# Patient Record
Sex: FEMALE | Race: WHITE | NOT HISPANIC OR LATINO | Employment: FULL TIME | URBAN - METROPOLITAN AREA
[De-identification: names, ages, dates, MRNs, and addresses within clinical notes are randomized per-mention and may not be internally consistent; named-entity substitution may affect disease eponyms.]

---

## 2023-07-13 ENCOUNTER — APPOINTMENT (OUTPATIENT)
Dept: PRIMARY CARE | Facility: CLINIC | Age: 43
End: 2023-07-13
Payer: COMMERCIAL

## 2023-08-24 ENCOUNTER — APPOINTMENT (OUTPATIENT)
Dept: PRIMARY CARE | Facility: CLINIC | Age: 43
End: 2023-08-24
Payer: COMMERCIAL

## 2023-08-24 PROBLEM — I83.90 VARICOSE VEINS OF LOWER LIMB: Status: ACTIVE | Noted: 2023-08-24

## 2023-08-24 PROBLEM — N39.0 UTI (URINARY TRACT INFECTION): Status: ACTIVE | Noted: 2023-08-24

## 2023-08-24 PROBLEM — G43.909 MIGRAINE HEADACHE: Status: ACTIVE | Noted: 2023-08-24

## 2023-08-24 PROBLEM — O09.01 PREGNANCY WITH HISTORY OF INFERTILITY IN FIRST TRIMESTER (HHS-HCC): Status: ACTIVE | Noted: 2023-08-24

## 2023-08-24 PROBLEM — H81.10 BENIGN PAROXYSMAL POSITIONAL VERTIGO: Status: ACTIVE | Noted: 2023-08-24

## 2023-08-24 PROBLEM — R42 VERTIGO: Status: ACTIVE | Noted: 2023-08-24

## 2023-08-24 PROBLEM — R43.0 ANOSMIA: Status: ACTIVE | Noted: 2023-08-24

## 2023-08-24 PROBLEM — M54.50 LOW BACK PAIN: Status: ACTIVE | Noted: 2023-08-24

## 2023-08-24 PROBLEM — M25.579 PAIN, ANKLE: Status: ACTIVE | Noted: 2023-08-24

## 2023-08-24 PROBLEM — N91.5 OLIGOMENORRHEA: Status: ACTIVE | Noted: 2023-08-24

## 2023-08-24 PROBLEM — R13.10 ODYNOPHAGIA: Status: ACTIVE | Noted: 2023-08-24

## 2023-08-24 PROBLEM — R35.0 URINARY FREQUENCY: Status: ACTIVE | Noted: 2023-08-24

## 2023-08-24 PROBLEM — G43.109 OCULAR MIGRAINE: Status: ACTIVE | Noted: 2023-08-24

## 2023-08-24 PROBLEM — I83.819 VARICOSE VEINS WITH PAIN: Status: ACTIVE | Noted: 2023-08-24

## 2023-08-24 PROBLEM — Z20.822 EXPOSURE TO COVID-19 VIRUS: Status: ACTIVE | Noted: 2023-08-24

## 2023-08-24 PROBLEM — R92.30 DENSE BREAST TISSUE ON MAMMOGRAM: Status: ACTIVE | Noted: 2023-08-24

## 2023-08-24 PROBLEM — R73.9 HYPERGLYCEMIA: Status: ACTIVE | Noted: 2023-08-24

## 2023-08-24 PROBLEM — K21.9 GERD (GASTROESOPHAGEAL REFLUX DISEASE): Status: ACTIVE | Noted: 2023-08-24

## 2023-08-24 PROBLEM — K21.9 LARYNGOPHARYNGEAL REFLUX (LPR): Status: ACTIVE | Noted: 2023-08-24

## 2023-08-24 PROBLEM — R49.0 VOICE HOARSENESS: Status: ACTIVE | Noted: 2023-08-24

## 2023-08-24 RX ORDER — SUMATRIPTAN SUCCINATE 100 MG/1
100 TABLET ORAL AS NEEDED
COMMUNITY
End: 2023-09-07 | Stop reason: SDUPTHER

## 2023-09-07 ENCOUNTER — OFFICE VISIT (OUTPATIENT)
Dept: PRIMARY CARE | Facility: CLINIC | Age: 43
End: 2023-09-07
Payer: COMMERCIAL

## 2023-09-07 VITALS
WEIGHT: 109 LBS | TEMPERATURE: 98.2 F | RESPIRATION RATE: 16 BRPM | OXYGEN SATURATION: 100 % | HEART RATE: 76 BPM | SYSTOLIC BLOOD PRESSURE: 100 MMHG | DIASTOLIC BLOOD PRESSURE: 66 MMHG | HEIGHT: 66 IN | BODY MASS INDEX: 17.52 KG/M2

## 2023-09-07 DIAGNOSIS — G43.109 OCULAR MIGRAINE: ICD-10-CM

## 2023-09-07 DIAGNOSIS — Z00.00 HEALTH CARE MAINTENANCE: Primary | ICD-10-CM

## 2023-09-07 DIAGNOSIS — Z12.11 SCREEN FOR COLON CANCER: ICD-10-CM

## 2023-09-07 PROBLEM — R13.10 ODYNOPHAGIA: Status: RESOLVED | Noted: 2023-08-24 | Resolved: 2023-09-07

## 2023-09-07 PROBLEM — R43.0 ANOSMIA: Status: RESOLVED | Noted: 2023-08-24 | Resolved: 2023-09-07

## 2023-09-07 PROBLEM — I83.819 VARICOSE VEINS WITH PAIN: Status: RESOLVED | Noted: 2023-08-24 | Resolved: 2023-09-07

## 2023-09-07 PROBLEM — R49.0 VOICE HOARSENESS: Status: RESOLVED | Noted: 2023-08-24 | Resolved: 2023-09-07

## 2023-09-07 PROBLEM — M54.50 LOW BACK PAIN: Status: RESOLVED | Noted: 2023-08-24 | Resolved: 2023-09-07

## 2023-09-07 PROBLEM — K21.9 LARYNGOPHARYNGEAL REFLUX (LPR): Status: RESOLVED | Noted: 2023-08-24 | Resolved: 2023-09-07

## 2023-09-07 PROBLEM — R92.30 DENSE BREAST TISSUE ON MAMMOGRAM: Status: RESOLVED | Noted: 2023-08-24 | Resolved: 2023-09-07

## 2023-09-07 PROBLEM — G43.909 MIGRAINE HEADACHE: Status: RESOLVED | Noted: 2023-08-24 | Resolved: 2023-09-07

## 2023-09-07 PROBLEM — I83.90 VARICOSE VEINS OF LOWER LIMB: Status: RESOLVED | Noted: 2023-08-24 | Resolved: 2023-09-07

## 2023-09-07 PROBLEM — N39.0 UTI (URINARY TRACT INFECTION): Status: RESOLVED | Noted: 2023-08-24 | Resolved: 2023-09-07

## 2023-09-07 PROBLEM — M25.579 PAIN, ANKLE: Status: RESOLVED | Noted: 2023-08-24 | Resolved: 2023-09-07

## 2023-09-07 PROBLEM — H81.10 BENIGN PAROXYSMAL POSITIONAL VERTIGO: Status: RESOLVED | Noted: 2023-08-24 | Resolved: 2023-09-07

## 2023-09-07 PROBLEM — Z20.822 EXPOSURE TO COVID-19 VIRUS: Status: RESOLVED | Noted: 2023-08-24 | Resolved: 2023-09-07

## 2023-09-07 PROBLEM — R35.0 URINARY FREQUENCY: Status: RESOLVED | Noted: 2023-08-24 | Resolved: 2023-09-07

## 2023-09-07 PROBLEM — N91.5 OLIGOMENORRHEA: Status: RESOLVED | Noted: 2023-08-24 | Resolved: 2023-09-07

## 2023-09-07 PROBLEM — R73.9 HYPERGLYCEMIA: Status: RESOLVED | Noted: 2023-08-24 | Resolved: 2023-09-07

## 2023-09-07 PROBLEM — R42 VERTIGO: Status: RESOLVED | Noted: 2023-08-24 | Resolved: 2023-09-07

## 2023-09-07 PROBLEM — O09.01 PREGNANCY WITH HISTORY OF INFERTILITY IN FIRST TRIMESTER (HHS-HCC): Status: RESOLVED | Noted: 2023-08-24 | Resolved: 2023-09-07

## 2023-09-07 PROCEDURE — 1036F TOBACCO NON-USER: CPT | Performed by: INTERNAL MEDICINE

## 2023-09-07 PROCEDURE — 99396 PREV VISIT EST AGE 40-64: CPT | Performed by: INTERNAL MEDICINE

## 2023-09-07 RX ORDER — SUMATRIPTAN SUCCINATE 100 MG/1
100 TABLET ORAL AS NEEDED
Qty: 12 TABLET | Refills: 1 | Status: SHIPPED | OUTPATIENT
Start: 2023-09-07 | End: 2024-01-08

## 2023-09-07 ASSESSMENT — ENCOUNTER SYMPTOMS
DIARRHEA: 0
HEADACHES: 0
EYE REDNESS: 0
COUGH: 0
UNEXPECTED WEIGHT CHANGE: 0
ARTHRALGIAS: 0
BACK PAIN: 0
EYE ITCHING: 0
EYE PAIN: 0
FATIGUE: 0
PALPITATIONS: 0
FEVER: 0
WEAKNESS: 0
ABDOMINAL PAIN: 0
DYSURIA: 0
SORE THROAT: 0
CONSTIPATION: 0
DIFFICULTY URINATING: 0
NAUSEA: 0
FREQUENCY: 0
PSYCHIATRIC NEGATIVE: 1
WHEEZING: 0
RHINORRHEA: 0
SHORTNESS OF BREATH: 0

## 2023-09-07 ASSESSMENT — PAIN SCALES - GENERAL: PAINLEVEL: 0-NO PAIN

## 2023-09-07 ASSESSMENT — PATIENT HEALTH QUESTIONNAIRE - PHQ9
1. LITTLE INTEREST OR PLEASURE IN DOING THINGS: NOT AT ALL
SUM OF ALL RESPONSES TO PHQ9 QUESTIONS 1 AND 2: 0
2. FEELING DOWN, DEPRESSED OR HOPELESS: NOT AT ALL

## 2023-09-07 NOTE — PROGRESS NOTES
"Subjective   Sheeba Omalley is a 42 y.o. female who presents for Annual Exam.    HPI   Influenza at work  Covid 2021  Tdap 2017  Mammogram 7/2023  Pap 2020 GYN  Bmi 17  Depression screen 23  Eye exam 23    No concerns    Review of Systems   Constitutional:  Negative for fatigue, fever and unexpected weight change.   HENT:  Negative for congestion, ear pain, rhinorrhea and sore throat.    Eyes:  Negative for pain, redness and itching.   Respiratory:  Negative for cough, shortness of breath and wheezing.    Cardiovascular:  Negative for chest pain, palpitations and leg swelling.   Gastrointestinal:  Negative for abdominal pain, constipation, diarrhea and nausea.   Genitourinary:  Negative for difficulty urinating, dysuria and frequency.   Musculoskeletal:  Negative for arthralgias and back pain.   Allergic/Immunologic: Negative for environmental allergies, food allergies and immunocompromised state.   Neurological:  Negative for weakness and headaches.   Psychiatric/Behavioral: Negative.     All other systems reviewed and are negative.      Health Maintenance Due   Topic Date Due    Yearly Adult Physical  Never done    Hepatitis B Vaccines (1 of 3 - 3-dose series) Never done    HIV Screening  Never done    MMR Vaccines (1 of 1 - Standard series) Never done    Hepatitis C Screening  Never done    COVID-19 Vaccine (3 - Moderna series) 04/16/2021    Cervical Cancer Screening  07/30/2023    Influenza Vaccine (1) 09/01/2023       Objective   /66   Pulse 76   Temp 36.8 °C (98.2 °F)   Resp 16   Ht 1.676 m (5' 6\")   Wt 49.4 kg (109 lb)   LMP 08/23/2023 (Approximate)   SpO2 100%   BMI 17.59 kg/m²     Physical Exam  Vitals and nursing note reviewed.   Constitutional:       Appearance: Normal appearance.   HENT:      Head: Normocephalic.   Eyes:      Conjunctiva/sclera: Conjunctivae normal.      Pupils: Pupils are equal, round, and reactive to light.   Cardiovascular:      Rate and Rhythm: Normal rate and " regular rhythm.      Pulses: Normal pulses.      Heart sounds: Normal heart sounds.   Pulmonary:      Effort: Pulmonary effort is normal.      Breath sounds: Normal breath sounds.   Musculoskeletal:         General: No swelling.      Cervical back: Neck supple.   Skin:     General: Skin is warm and dry.   Neurological:      General: No focal deficit present.      Mental Status: She is oriented to person, place, and time.         Assessment/Plan   Problem List Items Addressed This Visit       Ocular migraine    Relevant Medications    SUMAtriptan (Imitrex) 100 mg tablet     Other Visit Diagnoses       Health care maintenance    -  Primary    Relevant Orders    CBC    Comprehensive Metabolic Panel    Lipid Panel    Thyroid Stimulating Hormone    Vitamin B12    Vitamin D 25-Hydroxy,Total (for eval of Vitamin D levels)

## 2024-01-06 DIAGNOSIS — G43.109 OCULAR MIGRAINE: ICD-10-CM

## 2024-01-08 RX ORDER — SUMATRIPTAN SUCCINATE 100 MG/1
100 TABLET ORAL AS NEEDED
Qty: 12 TABLET | Refills: 1 | Status: SHIPPED | OUTPATIENT
Start: 2024-01-08 | End: 2024-02-13 | Stop reason: SDUPTHER

## 2024-01-09 ENCOUNTER — TELEPHONE (OUTPATIENT)
Dept: GASTROENTEROLOGY | Facility: CLINIC | Age: 44
End: 2024-01-09
Payer: COMMERCIAL

## 2024-02-08 ENCOUNTER — OFFICE VISIT (OUTPATIENT)
Dept: PRIMARY CARE | Facility: CLINIC | Age: 44
End: 2024-02-08
Payer: COMMERCIAL

## 2024-02-08 VITALS
BODY MASS INDEX: 18.24 KG/M2 | HEART RATE: 78 BPM | DIASTOLIC BLOOD PRESSURE: 64 MMHG | WEIGHT: 113 LBS | TEMPERATURE: 97.8 F | OXYGEN SATURATION: 98 % | RESPIRATION RATE: 18 BRPM | SYSTOLIC BLOOD PRESSURE: 112 MMHG

## 2024-02-08 DIAGNOSIS — R39.9 UTI SYMPTOMS: Primary | ICD-10-CM

## 2024-02-08 LAB
POC APPEARANCE, URINE: CLEAR
POC BILIRUBIN, URINE: NEGATIVE
POC BLOOD, URINE: NEGATIVE
POC COLOR, URINE: YELLOW
POC GLUCOSE, URINE: NEGATIVE MG/DL
POC KETONES, URINE: NEGATIVE MG/DL
POC LEUKOCYTES, URINE: NEGATIVE
POC NITRITE,URINE: NEGATIVE
POC PH, URINE: 6 PH
POC PROTEIN, URINE: NEGATIVE MG/DL
POC SPECIFIC GRAVITY, URINE: 1.02
POC UROBILINOGEN, URINE: 0.2 EU/DL
PREGNANCY TEST URINE, POC: NEGATIVE

## 2024-02-08 PROCEDURE — 87086 URINE CULTURE/COLONY COUNT: CPT

## 2024-02-08 PROCEDURE — 81025 URINE PREGNANCY TEST: CPT | Performed by: FAMILY MEDICINE

## 2024-02-08 PROCEDURE — 1036F TOBACCO NON-USER: CPT | Performed by: FAMILY MEDICINE

## 2024-02-08 PROCEDURE — 99214 OFFICE O/P EST MOD 30 MIN: CPT | Performed by: FAMILY MEDICINE

## 2024-02-08 PROCEDURE — 81002 URINALYSIS NONAUTO W/O SCOPE: CPT | Performed by: FAMILY MEDICINE

## 2024-02-08 RX ORDER — NITROFURANTOIN 25; 75 MG/1; MG/1
100 CAPSULE ORAL 2 TIMES DAILY
Qty: 6 CAPSULE | Refills: 0 | Status: SHIPPED | OUTPATIENT
Start: 2024-02-08 | End: 2024-02-13 | Stop reason: ALTCHOICE

## 2024-02-08 ASSESSMENT — ENCOUNTER SYMPTOMS
HEMATURIA: 0
CONSTIPATION: 0
WHEEZING: 0
FLANK PAIN: 1
VOMITING: 0
FATIGUE: 0
DIARRHEA: 0
FREQUENCY: 1
DYSURIA: 0
BLOOD IN STOOL: 0
SHORTNESS OF BREATH: 0
DIFFICULTY URINATING: 0
COUGH: 0
NAUSEA: 0
FEVER: 0

## 2024-02-08 NOTE — ASSESSMENT & PLAN NOTE
Advise pt ua appears normal, will send out uc  Pt does not want to start atbx unless uc positive.  Pt declines any std testing today  Advise pt if urinary symptoms worsen or uc is positive then start macrodantin, call on call MD for advise if unsure of what to do based on uc.  Increase flds  F/up with pcp if cont symptoms

## 2024-02-08 NOTE — PROGRESS NOTES
Subjective   Patient ID: Sheeba mOalley is a 43 y.o. female who presents for UTI.    UTI   This is a new problem. The current episode started 1 to 4 weeks ago (2 weeks). The problem occurs intermittently. The problem has been waxing and waning. The quality of the pain is described as aching. The pain is mild. Associated symptoms include flank pain, frequency and urgency. Pertinent negatives include no hematuria, nausea or vomiting. Associated symptoms comments: bloating. She has tried nothing for the symptoms.        Review of Systems   Constitutional:  Negative for fatigue and fever.   Respiratory:  Negative for cough, shortness of breath and wheezing.    Cardiovascular:  Negative for chest pain.   Gastrointestinal:  Negative for blood in stool, constipation, diarrhea, nausea and vomiting.        Pt feels bloating, no pain, had urinary symptoms x 2 wk  No change in diet , no new foods.No travel.  Daily bm, have been looser than normal.   Genitourinary:  Positive for flank pain, frequency and urgency. Negative for difficulty urinating, dysuria, hematuria, vaginal discharge and vaginal pain.        2 wk hx of bloating and urinary symptoms.  Pt is sexually active, 1 partner, no hx of std. Has been monogamous.  LMP= 2/4/24  Pt not on birth control , denies pregnancy.       Objective   /64   Pulse 78   Temp 36.6 °C (97.8 °F) (Temporal)   Resp 18   Wt 51.3 kg (113 lb)   SpO2 98%   BMI 18.24 kg/m²     Physical Exam  Vitals and nursing note reviewed.   Constitutional:       General: She is not in acute distress.     Appearance: Normal appearance.   HENT:      Head: Normocephalic and atraumatic.   Cardiovascular:      Rate and Rhythm: Normal rate and regular rhythm.      Heart sounds: Normal heart sounds.   Pulmonary:      Effort: Pulmonary effort is normal.      Breath sounds: Normal breath sounds.   Abdominal:      General: Abdomen is flat. Bowel sounds are normal.      Palpations: Abdomen is soft. There  is no mass.      Tenderness: There is no abdominal tenderness. There is no right CVA tenderness, left CVA tenderness, guarding or rebound.      Comments: Abd exam is benign, this is not a surgical abd at this time   Skin:     General: Skin is warm and dry.   Neurological:      Mental Status: She is alert.         Assessment/Plan   Problem List Items Addressed This Visit             ICD-10-CM    UTI symptoms - Primary R39.9     Advise pt ua appears normal, will send out uc  Pt does not want to start atbx unless uc positive.  Pt declines any std testing today  Advise pt if urinary symptoms worsen or uc is positive then start macrodantin, call on call MD for advise if unsure of what to do based on uc.  Increase flds  F/up with pcp if cont symptoms         Relevant Medications    nitrofurantoin, macrocrystal-monohydrate, (Macrobid) 100 mg capsule    Other Relevant Orders    Urine Culture    POCT UA (nonautomated) manually resulted (Completed)    POCT pregnancy, urine manually resulted (Completed)

## 2024-02-09 LAB — BACTERIA UR CULT: NORMAL

## 2024-02-13 ENCOUNTER — OFFICE VISIT (OUTPATIENT)
Dept: PRIMARY CARE | Facility: CLINIC | Age: 44
End: 2024-02-13
Payer: COMMERCIAL

## 2024-02-13 ENCOUNTER — LAB (OUTPATIENT)
Dept: LAB | Facility: LAB | Age: 44
End: 2024-02-13
Payer: COMMERCIAL

## 2024-02-13 VITALS
OXYGEN SATURATION: 100 % | DIASTOLIC BLOOD PRESSURE: 72 MMHG | RESPIRATION RATE: 16 BRPM | HEIGHT: 66 IN | WEIGHT: 114 LBS | TEMPERATURE: 98.7 F | BODY MASS INDEX: 18.32 KG/M2 | HEART RATE: 80 BPM | SYSTOLIC BLOOD PRESSURE: 116 MMHG

## 2024-02-13 DIAGNOSIS — R10.84 DIFFUSE ABDOMINAL PAIN: ICD-10-CM

## 2024-02-13 DIAGNOSIS — Z00.00 HEALTH CARE MAINTENANCE: ICD-10-CM

## 2024-02-13 DIAGNOSIS — G43.109 OCULAR MIGRAINE: ICD-10-CM

## 2024-02-13 DIAGNOSIS — R10.84 DIFFUSE ABDOMINAL PAIN: Primary | ICD-10-CM

## 2024-02-13 PROBLEM — R39.9 UTI SYMPTOMS: Status: RESOLVED | Noted: 2024-02-08 | Resolved: 2024-02-13

## 2024-02-13 LAB
25(OH)D3 SERPL-MCNC: 32 NG/ML (ref 30–100)
ALBUMIN SERPL BCP-MCNC: 4.3 G/DL (ref 3.4–5)
ALP SERPL-CCNC: 41 U/L (ref 33–110)
ALT SERPL W P-5'-P-CCNC: 15 U/L (ref 7–45)
ANION GAP SERPL CALC-SCNC: 10 MMOL/L (ref 10–20)
AST SERPL W P-5'-P-CCNC: 18 U/L (ref 9–39)
BILIRUB SERPL-MCNC: 0.6 MG/DL (ref 0–1.2)
BUN SERPL-MCNC: 16 MG/DL (ref 6–23)
CALCIUM SERPL-MCNC: 9 MG/DL (ref 8.6–10.3)
CHLORIDE SERPL-SCNC: 102 MMOL/L (ref 98–107)
CHOLEST SERPL-MCNC: 177 MG/DL (ref 0–199)
CHOLESTEROL/HDL RATIO: 2.8
CO2 SERPL-SCNC: 30 MMOL/L (ref 21–32)
CREAT SERPL-MCNC: 0.64 MG/DL (ref 0.5–1.05)
CRP SERPL-MCNC: <0.1 MG/DL
EGFRCR SERPLBLD CKD-EPI 2021: >90 ML/MIN/1.73M*2
ERYTHROCYTE [DISTWIDTH] IN BLOOD BY AUTOMATED COUNT: 12.6 % (ref 11.5–14.5)
ERYTHROCYTE [SEDIMENTATION RATE] IN BLOOD BY WESTERGREN METHOD: 1 MM/H (ref 0–20)
GLUCOSE SERPL-MCNC: 90 MG/DL (ref 74–99)
HCT VFR BLD AUTO: 39.2 % (ref 36–46)
HDLC SERPL-MCNC: 64.3 MG/DL
HGB BLD-MCNC: 13.4 G/DL (ref 12–16)
LDLC SERPL CALC-MCNC: 89 MG/DL
MCH RBC QN AUTO: 32.1 PG (ref 26–34)
MCHC RBC AUTO-ENTMCNC: 34.2 G/DL (ref 32–36)
MCV RBC AUTO: 94 FL (ref 80–100)
NON HDL CHOLESTEROL: 113 MG/DL (ref 0–149)
NRBC BLD-RTO: 0 /100 WBCS (ref 0–0)
PLATELET # BLD AUTO: 253 X10*3/UL (ref 150–450)
POTASSIUM SERPL-SCNC: 3.8 MMOL/L (ref 3.5–5.3)
PROT SERPL-MCNC: 6.8 G/DL (ref 6.4–8.2)
RBC # BLD AUTO: 4.18 X10*6/UL (ref 4–5.2)
SODIUM SERPL-SCNC: 138 MMOL/L (ref 136–145)
TRIGL SERPL-MCNC: 120 MG/DL (ref 0–149)
TSH SERPL-ACNC: 0.73 MIU/L (ref 0.44–3.98)
VIT B12 SERPL-MCNC: 400 PG/ML (ref 211–911)
VLDL: 24 MG/DL (ref 0–40)
WBC # BLD AUTO: 6.9 X10*3/UL (ref 4.4–11.3)

## 2024-02-13 PROCEDURE — 36415 COLL VENOUS BLD VENIPUNCTURE: CPT

## 2024-02-13 PROCEDURE — 84443 ASSAY THYROID STIM HORMONE: CPT

## 2024-02-13 PROCEDURE — 80061 LIPID PANEL: CPT

## 2024-02-13 PROCEDURE — 85027 COMPLETE CBC AUTOMATED: CPT

## 2024-02-13 PROCEDURE — 82306 VITAMIN D 25 HYDROXY: CPT

## 2024-02-13 PROCEDURE — 85652 RBC SED RATE AUTOMATED: CPT

## 2024-02-13 PROCEDURE — 86140 C-REACTIVE PROTEIN: CPT

## 2024-02-13 PROCEDURE — 82607 VITAMIN B-12: CPT

## 2024-02-13 PROCEDURE — 1036F TOBACCO NON-USER: CPT | Performed by: INTERNAL MEDICINE

## 2024-02-13 PROCEDURE — 99214 OFFICE O/P EST MOD 30 MIN: CPT | Performed by: INTERNAL MEDICINE

## 2024-02-13 PROCEDURE — 80053 COMPREHEN METABOLIC PANEL: CPT

## 2024-02-13 RX ORDER — SUMATRIPTAN SUCCINATE 100 MG/1
100 TABLET ORAL AS NEEDED
Qty: 12 TABLET | Refills: 1 | Status: SHIPPED | OUTPATIENT
Start: 2024-02-13

## 2024-02-13 ASSESSMENT — PATIENT HEALTH QUESTIONNAIRE - PHQ9
2. FEELING DOWN, DEPRESSED OR HOPELESS: NOT AT ALL
1. LITTLE INTEREST OR PLEASURE IN DOING THINGS: NOT AT ALL
SUM OF ALL RESPONSES TO PHQ9 QUESTIONS 1 AND 2: 0

## 2024-02-13 ASSESSMENT — ENCOUNTER SYMPTOMS
FEVER: 0
COUGH: 0
SHORTNESS OF BREATH: 0
FATIGUE: 0

## 2024-02-13 NOTE — PROGRESS NOTES
"Subjective   Sheeba Omalley is a 43 y.o. female who presents for Abdominal Pain.    HPI   Pt c/o abominal distention x3 weeks.    Uncomfortable.  Seen at [facility] 2/8/24.  Negative for UTI, pregnancy.  Rx: Macrobid.  Stopped after 4 doses d/t negative UA C&S.  Occasional nausea.  Denies constipation/diarrhea/emesis.  Denies visible blood in stool, black stool.  No new dietary changes, med changes.    3.5 weeks distention and bloated  Bm daily  Minor loose stool  Had period and no improvement  Ok in am  More distended as day goes on  Min nausea  Periods are regular  Sick contacts        Review of Systems   Constitutional:  Negative for fatigue and fever.   Respiratory:  Negative for cough and shortness of breath.    Cardiovascular:  Negative for chest pain and leg swelling.   All other systems reviewed and are negative.      Health Maintenance Due   Topic Date Due    Hepatitis B Vaccines (1 of 3 - 3-dose series) Never done    HIV Screening  Never done    Colorectal Cancer Screening  Never done    MMR Vaccines (1 of 1 - Standard series) Never done    Hepatitis C Screening  Never done    Cervical Cancer Screening  07/30/2023    Influenza Vaccine (1) 09/01/2023    COVID-19 Vaccine (3 - 2023-24 season) 09/01/2023       Objective   /72   Pulse 80   Temp 37.1 °C (98.7 °F)   Resp 16   Ht 1.676 m (5' 6\")   Wt 51.7 kg (114 lb)   SpO2 100%   BMI 18.40 kg/m²     Physical Exam  Vitals and nursing note reviewed.   Constitutional:       Appearance: Normal appearance.   HENT:      Head: Normocephalic.   Eyes:      Conjunctiva/sclera: Conjunctivae normal.      Pupils: Pupils are equal, round, and reactive to light.   Cardiovascular:      Rate and Rhythm: Normal rate and regular rhythm.      Pulses: Normal pulses.      Heart sounds: Normal heart sounds.   Pulmonary:      Effort: Pulmonary effort is normal.      Breath sounds: Normal breath sounds.   Abdominal:      General: There is distension.      " Tenderness: There is no abdominal tenderness.   Musculoskeletal:         General: No swelling.      Cervical back: Neck supple.   Skin:     General: Skin is warm and dry.   Neurological:      General: No focal deficit present.      Mental Status: She is oriented to person, place, and time.         Assessment/Plan   Problem List Items Addressed This Visit       Ocular migraine    Relevant Medications    SUMAtriptan (Imitrex) 100 mg tablet     Other Visit Diagnoses       Diffuse abdominal pain    -  Primary    Relevant Orders    Abdominal Ultrasound    Sedimentation Rate    C-reactive protein    US pelvis          Check  us and labs  Stable based on symptoms and exam.  Continue established treatment plan and follow up at least yearly.  Cont meds

## 2024-02-14 ENCOUNTER — HOSPITAL ENCOUNTER (OUTPATIENT)
Dept: RADIOLOGY | Facility: HOSPITAL | Age: 44
Discharge: HOME | End: 2024-02-14
Payer: COMMERCIAL

## 2024-02-14 ENCOUNTER — TELEPHONE (OUTPATIENT)
Dept: PRIMARY CARE | Facility: CLINIC | Age: 44
End: 2024-02-14
Payer: COMMERCIAL

## 2024-02-14 DIAGNOSIS — R10.84 DIFFUSE ABDOMINAL PAIN: ICD-10-CM

## 2024-02-14 PROCEDURE — 76830 TRANSVAGINAL US NON-OB: CPT | Performed by: RADIOLOGY

## 2024-02-14 PROCEDURE — 76856 US EXAM PELVIC COMPLETE: CPT | Performed by: RADIOLOGY

## 2024-02-14 PROCEDURE — 76856 US EXAM PELVIC COMPLETE: CPT

## 2024-02-14 PROCEDURE — 93976 VASCULAR STUDY: CPT | Performed by: RADIOLOGY

## 2024-02-14 NOTE — TELEPHONE ENCOUNTER
Dr. Mendiola ordered US Pelvis and Abdominal US yesterday. Visible in her visit note.  Does not look like Abdominal US  was scheduled (visible under procedures) and pelvic was changed today to US Pelvis Transabdominal w/Transvaginal?  On your end, do you happen to see/remember if you scheduled pt for both US Pelvis and Abdominal US?

## 2024-02-15 ENCOUNTER — TELEPHONE (OUTPATIENT)
Dept: PRIMARY CARE | Facility: CLINIC | Age: 44
End: 2024-02-15
Payer: COMMERCIAL

## 2024-02-15 DIAGNOSIS — R10.84 DIFFUSE ABDOMINAL PAIN: Primary | ICD-10-CM

## 2024-02-15 NOTE — TELEPHONE ENCOUNTER
----- Message from Lacy Lopez LPN sent at 2/15/2024  7:57 AM EST -----  Regarding: FW: Lab Results  Contact: [phone]  I sent Elva a message on this yesterday.  Can you please look into this?  (See additional note in chart)  ----- Message -----  From: Ivet Mendiola DO  Sent: 2/14/2024  10:56 AM EST  To: Lacy Lopez LPN  Subject: FW: Lab Results                                  I ordered both I can see the abdominal us in my note but dont see the order   Can you check with elva?  ----- Message -----  From: Lacy Lopez LPN  Sent: 2/14/2024  10:13 AM EST  To: Ivet Mendiola DO  Subject: FW: Lab Results                                    ----- Message -----  From: Sheeba Omalley  Sent: 2/14/2024   9:57 AM EST  To: [facility] [place] [id number] Clinical Support Staff  Subject: Lab Results                                      Thank you. I completed the ordered Pelvic Ultra sound today. However, we had discussed doing an abdominal and pelvic US since I was having abdominal distention. I’m not sure if she decided against the abdominal US or if it was accidentally not ordered. Thank you

## 2024-02-15 NOTE — TELEPHONE ENCOUNTER
I spoke with patient was understanding, could not schedule due to order not in and able to attach.  Can you please put new order in, patient said OK for us to schedule next week between 9-2

## 2024-02-15 NOTE — TELEPHONE ENCOUNTER
----- Message from Ivet Mendiola DO sent at 2/15/2024  1:12 PM EST -----  Call patient pelvic us is normal  I apolgize that no one told me they couldn't schedule the abd us I ordered

## 2024-02-15 NOTE — RESULT ENCOUNTER NOTE
Call patient pelvic us is normal  I apolgize that no one told me they couldn't schedule the abd us I ordered

## 2024-02-20 ENCOUNTER — APPOINTMENT (OUTPATIENT)
Dept: RADIOLOGY | Facility: HOSPITAL | Age: 44
End: 2024-02-20
Payer: COMMERCIAL

## 2024-02-21 ENCOUNTER — HOSPITAL ENCOUNTER (OUTPATIENT)
Dept: RADIOLOGY | Facility: HOSPITAL | Age: 44
Discharge: HOME | End: 2024-02-21
Payer: COMMERCIAL

## 2024-02-21 DIAGNOSIS — R10.84 DIFFUSE ABDOMINAL PAIN: ICD-10-CM

## 2024-02-21 PROCEDURE — 76700 US EXAM ABDOM COMPLETE: CPT

## 2024-02-21 PROCEDURE — 76700 US EXAM ABDOM COMPLETE: CPT | Performed by: RADIOLOGY

## 2024-02-23 ENCOUNTER — TELEPHONE (OUTPATIENT)
Dept: PRIMARY CARE | Facility: CLINIC | Age: 44
End: 2024-02-23
Payer: COMMERCIAL

## 2024-02-23 DIAGNOSIS — N13.39 OTHER HYDRONEPHROSIS: Primary | ICD-10-CM

## 2024-03-08 ENCOUNTER — HOSPITAL ENCOUNTER (OUTPATIENT)
Dept: RADIOLOGY | Facility: HOSPITAL | Age: 44
Discharge: HOME | End: 2024-03-08
Payer: COMMERCIAL

## 2024-03-08 DIAGNOSIS — N13.39 OTHER HYDRONEPHROSIS: ICD-10-CM

## 2024-03-08 PROCEDURE — 74178 CT ABD&PLV WO CNTR FLWD CNTR: CPT | Performed by: STUDENT IN AN ORGANIZED HEALTH CARE EDUCATION/TRAINING PROGRAM

## 2024-03-08 PROCEDURE — 76377 3D RENDER W/INTRP POSTPROCES: CPT | Performed by: STUDENT IN AN ORGANIZED HEALTH CARE EDUCATION/TRAINING PROGRAM

## 2024-03-08 PROCEDURE — 74178 CT ABD&PLV WO CNTR FLWD CNTR: CPT

## 2024-03-08 PROCEDURE — 2550000001 HC RX 255 CONTRASTS: Performed by: INTERNAL MEDICINE

## 2024-03-08 RX ADMIN — IOHEXOL 75 ML: 350 INJECTION, SOLUTION INTRAVENOUS at 17:07

## 2024-03-10 NOTE — PROGRESS NOTES
Referred by: Ivet Mendiola DO      PCP  Ivet Mendiola DO         CHIEF COMPLAINT:    Left hydronephrosis           HISTORY OF PRESENT ILLNESS:  1.Left hydronephrosis    Patient's history was reviewed. This is a 43 y.o. female,  who presents for evaluation of left hydronephrosis. She had abdominal distention and bloating, and then had a US for this concern. She has some intermittent lower back pain that resolves without intervention. She denies hematuria and Hx nephrolithiasis.                    Bowel Symptoms: None and does not take medications for her bowels       - Regular: Yes BM everyday       - Constipation: No       -Hard stools: No    Past medical and surgical hx reviewed - No pelvic surgeries       Gyn History:  - Menopausal: Yes, not today; last menses earlier this month      OB History          2    Para   2    Term                AB        Living             SAB        IAB        Ectopic        Multiple        Live Births                     Past Medical History:   Diagnosis Date    Dysuria 2015    Dysuria    Headache, unspecified     Chronic headaches    Personal history of other diseases of the female genital tract     Personal history of amenorrhea    Personal history of other malignant neoplasm of skin     History of basal cell carcinoma (BCC)    Unspecified ovarian cyst, right side     Bilateral ovarian cysts    Urinary tract infection, site not specified     UTI (lower urinary tract infection)    Urinary tract infection, site not specified 2015    Acute UTI       Past Surgical History:   Procedure Laterality Date    CT ANGIO NECK  2019    CT NECK ANGIO W AND WO IV CONTRAST 2019 [facility]    CT HEAD ANGIO W AND WO IV CONTRAST  2019    CT HEAD ANGIO W AND WO IV CONTRAST 2019 [facility] EMERGENCY LEGAC[facility]    MOUTH SURGERY  2014    Oral Surgery Tooth Extraction       Family History   Problem Relation Name Age of Onset    Breast cancer Mother       Hypertension Father      Atrial fibrillation Father         Review of Systems   Constitutional: Negative.    HENT: Negative.     Eyes: Negative.    Respiratory: Negative.     Cardiovascular: Negative.    Gastrointestinal: Negative.    Endocrine: Negative.    Genitourinary:         REFER TO HPI   Musculoskeletal: Negative.    Skin: Negative.    Allergic/Immunologic: Negative.    Neurological: Negative.    Hematological: Negative.    Psychiatric/Behavioral: Negative.                 PHYSICAL EXAMINATION:  No LMP recorded.  Body mass index is 18.08 kg/m².  Visit Vitals  /72   Pulse 73   Temp 36.5 °C (97.7 °F)   Wt 50.8 kg (112 lb)   BMI 18.08 kg/m²   OB Status Having periods   Smoking Status Never   BSA 1.54 m²     NP: Constitutional: alert and in no acute distress, well developed, well nourished.   Head and Face: head and face: unremarkable.   Neck: no neck asymmetry, supple.   Pulmonary: no respiratory distress, unremarkable respiratory effort.   Skin: normal skin color and pigmentation, normal skin turgor, and no rash.   Neurologic: cranial nerves: non-focal, grossly intact.   Psychiatric: alert and oriented x 3.     PVR (by Ultrasound): 0   Urine dip:   Recent Results (from the past 6 hour(s))   POCT UA Automated manually resulted    Collection Time: 03/13/24 11:32 AM   Result Value Ref Range    POC Color, Urine Yellow Straw, Yellow, Light-Yellow    POC Appearance, Urine Clear Clear    POC Glucose, Urine NEGATIVE NEGATIVE mg/dl    POC Bilirubin, Urine NEGATIVE NEGATIVE    POC Ketones, Urine NEGATIVE NEGATIVE mg/dl    POC Specific Gravity, Urine 1.025 1.005 - 1.035    POC Blood, Urine TRACE-Intact (A) NEGATIVE    POC PH, Urine 6.0 No Reference Range Established PH    POC Protein, Urine NEGATIVE NEGATIVE, 30 (1+) mg/dl    POC Urobilinogen, Urine 0.2 0.2, 1.0 EU/DL    Poc Nitrite, Urine NEGATIVE NEGATIVE    POC Leukocytes, Urine NEGATIVE NEGATIVE       I personally reviewed the CT Urogram scan dated  3/08/2024 with the patient today in clinic. It demonstrated no evidence of stones, as well as prompt excretion & ureters of equal caliber  FINDINGS:  LOWER CHEST:  The visualized lung base is unremarkable. The heart is normal in size  without evidence of pericardial effusion. No pleural effusion is  evident.  ABDOMEN:  KIDNEYS AND URETERS:  Right kidney and ureter:  No suspicious mass abnormal focus of enhancement. No renal calculi or  hydronephrosis. Normal contrast opacification of the ureter.  Left kidney and ureter:  No suspicious renal mass or abnormal focus of enhancement. No renal  calculi. No significant hydronephrosis. There is contrast  opacification of the ureter to its distal aspect with subsequent  abrupt cutoff (series 504, image 70). There is intermittent distal  opacification (series 501 images 327, 332 and 343), to the level of  the left UVJ. Findings suggestive of underlying mild stenosis. No  visualized calculus or definite obstructing lesion is identified.  BLADDER:  Fluid-filled moderately distended urinary bladder without suspicious  intraluminal mass, calculus or abnormal wall thickening.  LIVER:  The liver is enlarged and measures 19.3 cm in craniocaudal dimension.  Subcentimeter low-attenuation lesion left hepatic lobe, too small to  characterize likely benign. Otherwise, no suspicious lesion..  BILE DUCTS:  The bile ducts are not dilated.  GALLBLADDER:  The gallbladder is not distended and without calcified stones.  PANCREAS:  The pancreas appears unremarkable.  SPLEEN:  Within normal limits.  ADRENAL GLANDS:  Bilateral adrenal glands appear normal.  REPRODUCTIVE ORGANS:  No pelvic masses.  BOWEL:  Fluid-filled moderately distended stomach. No bowel dilation or wall  thickening. Large amount of colonic stool. Normal appendix.  VESSELS:  The aorta and IVC are within normal limits.  PERITONEUM/RETROPERITONEUM/LYMPH NODES:  No ascites or free air, no fluid collection.  The  retroperitoneum  appears unremarkable.  No abdominopelvic lymphadenopathy is present.  ABDOMINAL WALL:  The abdominal wall soft tissues appear normal  BONE AND SOFT TISSUE:  No suspicious osseous lesions are identified.  IMPRESSION:  1.  No suspicious renal masses or abnormal focus of enhancement. No  renal calculi or significant hydronephrosis. As described above,  there is abrupt cutoff of the distal left ureter as described above,  with subsequent intermittent opacification to the UVJ, suggestive of  mild stenosis. No visualized lesion or calculus is noted. Recommend  short-term ultrasound follow-up.  2. Urinary bladder is unremarkable.  3. Hepatomegaly without focal lesion.  4. Large amount of colonic stool.      I personally reviewed the US abdomen complete dated 2/21/2024 with the patient today in clinic. It demonstrated   FINDINGS:  LIVER:  The liver is homogeneous in echotexture. No focal hepatic lesion is  identified. Liver measures 17.7 cm in sagittal dimension.  GALLBLADDER:  The gallbladder is nondistended and demonstrates no evidence of  gallstones, wall thickening or surrounding fluid. The gallbladder  wall measures 2 mm. Sonographic Coleman's sign is negative.  BILIARY TREE:  Common bile duct does not appear dilated measuring 3 mm in diameter.  PANCREAS:  The visualized pancreas is unremarkable in appearance.  RIGHT KIDNEY:  Right kidney measures  10.9cm in length.  No shadowing calculus or  hydronephrosis is seen.  LEFT KIDNEY:  Left kidney measures  11.5cm in length.  Mild left hydronephrosis is  seen. No shadowing calculus demonstrated.  SPLEEN:  The spleen is not significantly enlarged measuring  12.3cm in length.  No discrete splenic lesion or perisplenic fluid is seen.  AORTA/IVC:  No aneurysmal dilation of the aorta is seen.  IVC is unremarkable.  IMPRESSION:  Mild left hydronephrosis of uncertain age or etiology. No shadowing  calculus demonstrated. CT could be considered for further  evaluation.      I personally reviewed the US PELVIS TRANSABDOMINAL WITH TRANSVAGINAL scan dated 2/14/2024 today in clinic. It demonstrated  FINDINGS:  UTERUS:  The uterus measures 8.8 x 4 x 5.3 cm. No uterine masses. Nabothian  cysts in the cervix.  ENDOMETRIUM:  The endometrium measures 0.9 cm. No focal abnormality.  RIGHT OVARY:  2.9 x 2.2 x 3.2 cm. No solid mass. Arterial and venous flow present.  LEFT OVARY:  3.2 x 2.5 x 3 cm. No solid mass. Arterial and venous flow present.  OTHER:  No significant pelvic free fluid.  IMPRESSION:  No worrisome abnormality.        I personally reviewed the UA dated 02/08/24 today in clinic. It demonstrated   Component  Ref Range & Units 1 mo ago   POC Color, Urine  Straw, Yellow, Light-Yellow Yellow   POC Appearance, Urine  Clear Clear   POC Glucose, Urine  NEGATIVE mg/dl NEGATIVE   POC Bilirubin, Urine  NEGATIVE NEGATIVE   POC Ketones, Urine  NEGATIVE mg/dl NEGATIVE   POC Specific Gravity, Urine  1.005 - 1.035 1.020   POC Blood, Urine  NEGATIVE NEGATIVE   POC PH, Urine  No Reference Range Established PH 6.0   POC Protein, Urine  NEGATIVE, 30 (1+) mg/dl NEGATIVE   POC Urobilinogen, Urine  0.2, 1.0 EU/DL 0.2   Poc Nitrite, Urine  NEGATIVE NEGATIVE   POC Leukocytes, Urine  NEGATIVE NEGATIVE     I personally reviewed the UA dated 05/29/20 with the patient today in clinic. It demonstrated       Component  Ref Range & Units 3 yr ago   Color, Urine  STRAW,YELLOW COLORLESS   Appearance, Urine  CLEAR CLEAR   Specific Gravity, Urine  1.005 - 1.035 1.003 Low    pH, Urine  5.0 - 8.0 8.0   Protein, Urine  NEGATIVE mg/dL NEGATIVE   Glucose, Urine  NEGATIVE mg/dL NEGATIVE   Blood, Urine  NEGATIVE SMALL (1+) Abnormal    Ketones, Urine  NEGATIVE mg/dL NEGATIVE   Bilirubin, Urine  NEGATIVE NEGATIVE   Urobilinogen, Urine  0.0 - 1.9 mg/dL <2.0   Nitrite, Urine  NEGATIVE NEGATIVE   Leukocyte Esterase, Urine  NEGATIVE SMALL (1+) Abnormal         I personally reviewed the Urinalysis Microscopic dated  20 with the patient today in clinic. It demonstrated       Component  Ref Range & Units 3 yr ago   WBC, Urine  0 - 5 /HPF 1   RBC, Urine  0 - 5 /HPF None   Bacteria, Urine  /HPF 1+ Abnormal    Mucus, Urine  /LPF 1+         IMPRESSION AND PLAN:  Sheeba Omalley is a 43 y.o.  who presents for evaluation of left hydronephrosis.    Problem List Items Addressed This Visit    None  Visit Diagnoses       Other hydronephrosis    -  Primary    Relevant Orders    Post-Void Residual (Completed)    POCT UA Automated manually resulted (Completed)             Rt hydroureter with no functional obstruction. I don't believe that she has ureteral stricture at the transition area in the right ureter. There is likely an element of extrinsic compression of the ureter from described large amount of stools throughout the colon . It is possible that her abdominal distention and bloating could be attributable to bowel concerns. We discussed that further evaluation of her kidney concerns could involve retrograde pyelogram or ureteroscopy, but it is my impression that there is not appropriate indications to proceed with these options.I will ask my radiologist colleague to review her imaging before considering these options. Since she has trace blood on UA today, I will send her urine for microscopy. I encouraged her to take Miralax every day or every other day to maintain regular BM.     All questions and concerns were answered and addressed.  The patient expressed understanding and agrees with the plan.       SIGNATURES  Scribe Attestation  By signing my name below, IRadha Scribe   attest that this documentation has been prepared under the direction and in the presence of Dennis Whitley MD.

## 2024-03-13 ENCOUNTER — OFFICE VISIT (OUTPATIENT)
Dept: UROLOGY | Facility: CLINIC | Age: 44
End: 2024-03-13
Payer: COMMERCIAL

## 2024-03-13 VITALS
TEMPERATURE: 97.7 F | BODY MASS INDEX: 18.08 KG/M2 | SYSTOLIC BLOOD PRESSURE: 124 MMHG | DIASTOLIC BLOOD PRESSURE: 72 MMHG | HEART RATE: 73 BPM | WEIGHT: 112 LBS

## 2024-03-13 DIAGNOSIS — N13.39 OTHER HYDRONEPHROSIS: Primary | ICD-10-CM

## 2024-03-13 DIAGNOSIS — R31.9 HEMATURIA, UNSPECIFIED TYPE: ICD-10-CM

## 2024-03-13 LAB
POC APPEARANCE, URINE: CLEAR
POC BILIRUBIN, URINE: NEGATIVE
POC BLOOD, URINE: ABNORMAL
POC COLOR, URINE: YELLOW
POC GLUCOSE, URINE: NEGATIVE MG/DL
POC KETONES, URINE: NEGATIVE MG/DL
POC LEUKOCYTES, URINE: NEGATIVE
POC NITRITE,URINE: NEGATIVE
POC PH, URINE: 6 PH
POC PROTEIN, URINE: NEGATIVE MG/DL
POC SPECIFIC GRAVITY, URINE: 1.02
POC UROBILINOGEN, URINE: 0.2 EU/DL

## 2024-03-13 PROCEDURE — 99204 OFFICE O/P NEW MOD 45 MIN: CPT | Performed by: UROLOGY

## 2024-03-13 PROCEDURE — 81003 URINALYSIS AUTO W/O SCOPE: CPT | Performed by: UROLOGY

## 2024-03-13 PROCEDURE — 1036F TOBACCO NON-USER: CPT | Performed by: UROLOGY

## 2024-03-13 ASSESSMENT — ENCOUNTER SYMPTOMS
GASTROINTESTINAL NEGATIVE: 1
HEMATOLOGIC/LYMPHATIC NEGATIVE: 1
NEUROLOGICAL NEGATIVE: 1
EYES NEGATIVE: 1
RESPIRATORY NEGATIVE: 1
ALLERGIC/IMMUNOLOGIC NEGATIVE: 1
MUSCULOSKELETAL NEGATIVE: 1
CARDIOVASCULAR NEGATIVE: 1
ENDOCRINE NEGATIVE: 1
CONSTITUTIONAL NEGATIVE: 1
PSYCHIATRIC NEGATIVE: 1

## 2024-03-15 NOTE — RESULT ENCOUNTER NOTE
Call patient ct showed constipation vs poss ureteral stricture   I saw that she saw the urologist and are working on constipation   Want her to let me know if she's not improving

## 2024-03-18 DIAGNOSIS — Z12.11 SCREEN FOR COLON CANCER: Primary | ICD-10-CM

## 2024-03-19 ENCOUNTER — OFFICE VISIT (OUTPATIENT)
Dept: OBSTETRICS AND GYNECOLOGY | Facility: CLINIC | Age: 44
End: 2024-03-19
Payer: COMMERCIAL

## 2024-03-19 VITALS
BODY MASS INDEX: 18.45 KG/M2 | SYSTOLIC BLOOD PRESSURE: 104 MMHG | DIASTOLIC BLOOD PRESSURE: 60 MMHG | HEIGHT: 66 IN | WEIGHT: 114.8 LBS

## 2024-03-19 DIAGNOSIS — N84.1 CERVICAL POLYP: ICD-10-CM

## 2024-03-19 DIAGNOSIS — Z12.31 VISIT FOR SCREENING MAMMOGRAM: ICD-10-CM

## 2024-03-19 DIAGNOSIS — Z01.419 WELL FEMALE EXAM WITH ROUTINE GYNECOLOGICAL EXAM: Primary | ICD-10-CM

## 2024-03-19 PROCEDURE — 88305 TISSUE EXAM BY PATHOLOGIST: CPT | Performed by: PATHOLOGY

## 2024-03-19 PROCEDURE — 88305 TISSUE EXAM BY PATHOLOGIST: CPT

## 2024-03-19 PROCEDURE — 1036F TOBACCO NON-USER: CPT | Performed by: OBSTETRICS & GYNECOLOGY

## 2024-03-19 PROCEDURE — 99396 PREV VISIT EST AGE 40-64: CPT | Performed by: OBSTETRICS & GYNECOLOGY

## 2024-03-19 NOTE — PROGRESS NOTES
Patient ID: Sheeba Omalley is a 43 y.o. female.    Procedures    Cervical polypectomy:  Date/Time: 3/19/2024 9:49 AM    Performed by: Yulia Goncalves MD  Authorized by: Yulia Goncalves MD    Procedure location: cervix    Consent:     Patient questions answered: yes      Risks and benefits of the procedure and its alternatives discussed: yes      Procedural risks discussed:  Bleeding, infection and repeat procedure    Consent obtained:  verbal    Consent given by:  Patient    Procedure:     Cervical polyp removal     The cervical polyp was visualized protruding from the cervical os on exam.  Removal was recommended and discussed with the patient.  Consent was obtained.      The cervical polyp was grasped with a ringed forceps. It was carefully twisted and removed from it's stalk. It was placed in formalin and will be sent to pathology.  There was minimal bleeding noted from the base of the stalk.  Monsel's was placed to achieve hemostasis.  Hemostasis was noted.        Post-procedure:     Patient tolerance of procedure:  Patient tolerated the procedure well with no immediate complications

## 2024-03-19 NOTE — PROGRESS NOTES
"Annual Exam    Pap: 2020 nml HPV-  Latonya: 2023 Cat 1  LMP: 3/2/2024  CC: None    Viki Caldera MA II     GYN ANNUAL EXAM    SUBJECTIVE    Sheeba Omalley is a 43 y.o. female who presents for annual exam today.  Her periods are monthly and fairly regular.  She is using withdrawal for contraception and is happy with this.  She has no complaints today.      PMH - migraines    PSH - wisdom teeth    OB history -   OB History    Para Term  AB Living   2 2 2     2   SAB IAB Ectopic Multiple Live Births           2      # Outcome Date GA Lbr Jose Manuel/2nd Weight Sex Delivery Anes PTL Lv   2 Term      Vag-Spont   JACQUELINE   1 Term      Vag-Spont   JACQUELINE       Last pap -   Normal HPV Negative-      Last mammogram - 2023 - BIRADs 1     Family history of breast or ovarian cancer - mother - breast cancer     OBJECTIVE  /60   Ht 1.676 m (5' 6\")   Wt 52.1 kg (114 lb 12.8 oz)   LMP 2024 (Exact Date)   BMI 18.53 kg/m²     General Appearance   - consistent with stated age, well groomed and cooperative    Integumentary  - skin warm and dry without rash    Head and Neck  - normalocephalic and neck supple    Chest and Lung Exam  - normal breathing effort, no respiratory distress    Breast  - symmetry noted, no mass palpable, no skin change and no nipple discharge.    Abdomen  - soft, nontender and no hepatomegaly, splenomegaly, or mass    Female Genitourinary  - vulva normal without rash or lesion, normal vaginal rugae, no vaginal discharge, uterus normal size & no palpable masses, no adnexal mass, no adnexal tenderness, no cervical motion tenderness; cervical polyp seen at the external cervical os - see procedure note     Peripheral Vascular  - no edema present    ASSESSMENT/PLAN  43 y.o.  female who presents for annual exam.       Actions performed during this visit include:  - Clinical breast exam  - Clinical pelvic exam  - Pap- UTD - will be next due in .   - Mammogram- Breast cancer " screening discussed and screening mammogram given   - Colon cancer screening- has order to schedule colonoscopy but has not scheduled yet   - Contraception - uses withdrawal - happy with this   - STI screening  - Declines     Please return for your next visit in 1 year or sooner as needed.     Yulia Goncalves MD

## 2024-03-21 DIAGNOSIS — Z12.11 COLON CANCER SCREENING: ICD-10-CM

## 2024-03-21 PROBLEM — R10.84 GENERALIZED ABDOMINAL PAIN: Status: ACTIVE | Noted: 2024-02-13

## 2024-03-21 PROBLEM — G89.29 CHRONIC HEADACHE DISORDER: Status: ACTIVE | Noted: 2024-03-21

## 2024-03-21 PROBLEM — R51.9 CHRONIC HEADACHE DISORDER: Status: ACTIVE | Noted: 2024-03-21

## 2024-03-21 PROBLEM — H92.03 OTALGIA OF BOTH EARS: Status: ACTIVE | Noted: 2024-03-21

## 2024-03-21 PROBLEM — Z85.828 HISTORY OF BASAL CELL CARCINOMA (BCC): Status: ACTIVE | Noted: 2024-03-21

## 2024-03-21 PROBLEM — H65.00 ACUTE SEROUS OTITIS MEDIA: Status: ACTIVE | Noted: 2024-03-21

## 2024-03-21 PROBLEM — S03.40XA SPRAIN OF TEMPOROMANDIBULAR JOINT: Status: ACTIVE | Noted: 2024-03-21

## 2024-03-21 PROBLEM — K60.2 ANAL FISSURE: Status: ACTIVE | Noted: 2024-03-21

## 2024-03-21 PROBLEM — K92.2 GASTROINTESTINAL HEMORRHAGE: Status: ACTIVE | Noted: 2024-03-21

## 2024-03-22 RX ORDER — SODIUM, POTASSIUM,MAG SULFATES 17.5-3.13G
1 SOLUTION, RECONSTITUTED, ORAL ORAL EVERY 12 HOURS
Qty: 2 EACH | Refills: 0 | Status: SHIPPED | OUTPATIENT
Start: 2024-03-22

## 2024-03-26 LAB
LABORATORY COMMENT REPORT: NORMAL
PATH REPORT.FINAL DX SPEC: NORMAL
PATH REPORT.GROSS SPEC: NORMAL
PATH REPORT.RELEVANT HX SPEC: NORMAL
PATH REPORT.TOTAL CANCER: NORMAL

## 2024-03-27 ENCOUNTER — TELEPHONE (OUTPATIENT)
Dept: OBSTETRICS AND GYNECOLOGY | Facility: CLINIC | Age: 44
End: 2024-03-27
Payer: COMMERCIAL

## 2024-04-03 ENCOUNTER — ANESTHESIA EVENT (OUTPATIENT)
Dept: GASTROENTEROLOGY | Facility: EXTERNAL LOCATION | Age: 44
End: 2024-04-03

## 2024-04-11 ENCOUNTER — ANESTHESIA (OUTPATIENT)
Dept: GASTROENTEROLOGY | Facility: EXTERNAL LOCATION | Age: 44
End: 2024-04-11

## 2024-04-11 ENCOUNTER — HOSPITAL ENCOUNTER (OUTPATIENT)
Dept: GASTROENTEROLOGY | Facility: EXTERNAL LOCATION | Age: 44
Discharge: HOME | End: 2024-04-11
Payer: COMMERCIAL

## 2024-04-11 VITALS
TEMPERATURE: 98.1 F | SYSTOLIC BLOOD PRESSURE: 106 MMHG | HEART RATE: 76 BPM | DIASTOLIC BLOOD PRESSURE: 80 MMHG | RESPIRATION RATE: 14 BRPM | OXYGEN SATURATION: 100 %

## 2024-04-11 DIAGNOSIS — Z12.11 SCREEN FOR COLON CANCER: ICD-10-CM

## 2024-04-11 PROCEDURE — 45378 DIAGNOSTIC COLONOSCOPY: CPT | Performed by: INTERNAL MEDICINE

## 2024-04-11 RX ORDER — PROPOFOL 10 MG/ML
INJECTION, EMULSION INTRAVENOUS AS NEEDED
Status: DISCONTINUED | OUTPATIENT
Start: 2024-04-11 | End: 2024-04-11

## 2024-04-11 RX ORDER — SODIUM CHLORIDE 9 MG/ML
20 INJECTION, SOLUTION INTRAVENOUS CONTINUOUS
Status: DISCONTINUED | OUTPATIENT
Start: 2024-04-11 | End: 2024-04-12 | Stop reason: HOSPADM

## 2024-04-11 RX ORDER — LIDOCAINE HYDROCHLORIDE 20 MG/ML
INJECTION, SOLUTION INFILTRATION; PERINEURAL AS NEEDED
Status: DISCONTINUED | OUTPATIENT
Start: 2024-04-11 | End: 2024-04-11

## 2024-04-11 RX ORDER — ONDANSETRON HYDROCHLORIDE 2 MG/ML
4 INJECTION, SOLUTION INTRAVENOUS ONCE AS NEEDED
Status: DISCONTINUED | OUTPATIENT
Start: 2024-04-11 | End: 2024-04-12 | Stop reason: HOSPADM

## 2024-04-11 RX ADMIN — PROPOFOL 100 MG: 10 INJECTION, EMULSION INTRAVENOUS at 11:33

## 2024-04-11 RX ADMIN — PROPOFOL 50 MG: 10 INJECTION, EMULSION INTRAVENOUS at 11:45

## 2024-04-11 RX ADMIN — PROPOFOL 50 MG: 10 INJECTION, EMULSION INTRAVENOUS at 11:34

## 2024-04-11 RX ADMIN — PROPOFOL 50 MG: 10 INJECTION, EMULSION INTRAVENOUS at 11:40

## 2024-04-11 RX ADMIN — SODIUM CHLORIDE: 9 INJECTION, SOLUTION INTRAVENOUS at 11:31

## 2024-04-11 RX ADMIN — LIDOCAINE HYDROCHLORIDE 40 MG: 20 INJECTION, SOLUTION INFILTRATION; PERINEURAL at 11:34

## 2024-04-11 RX ADMIN — PROPOFOL 50 MG: 10 INJECTION, EMULSION INTRAVENOUS at 11:37

## 2024-04-11 SDOH — HEALTH STABILITY: MENTAL HEALTH: CURRENT SMOKER: 0

## 2024-04-11 ASSESSMENT — PAIN SCALES - GENERAL
PAINLEVEL_OUTOF10: 0 - NO PAIN
PAIN_LEVEL: 0

## 2024-04-11 ASSESSMENT — PAIN - FUNCTIONAL ASSESSMENT
PAIN_FUNCTIONAL_ASSESSMENT: 0-10

## 2024-04-11 ASSESSMENT — COLUMBIA-SUICIDE SEVERITY RATING SCALE - C-SSRS
2. HAVE YOU ACTUALLY HAD ANY THOUGHTS OF KILLING YOURSELF?: NO
6. HAVE YOU EVER DONE ANYTHING, STARTED TO DO ANYTHING, OR PREPARED TO DO ANYTHING TO END YOUR LIFE?: NO
1. IN THE PAST MONTH, HAVE YOU WISHED YOU WERE DEAD OR WISHED YOU COULD GO TO SLEEP AND NOT WAKE UP?: NO

## 2024-04-11 NOTE — H&P
Outpatient Hospital Procedure    Patient Profile-Procedures  Initial Info  Patient Demographics  Name Sheeba Delcid  Date of Birth 1980  MRN [id number]  Address   [address] [facility] [phone] [facility] [address]    Primary Phone Number [phone]  Secondary Phone Number    Ivet Villalobos    Procedures   Colonoscopy      Indication:  Screening -     Primary contact name and number   Extended Emergency Contact Information  Primary Emergency Contact: DANNA DELCID  Mobile Phone: [phone]  Relation: Spouse  Preferred language: English   needed? No    General Health  Weight There were no vitals filed for this visit.  BMI There is no height or weight on file to calculate BMI.    Allergies  No Known Allergies    Past Medical History   Past Medical History:   Diagnosis Date    Dysuria 08/18/2015    Dysuria    Headache, unspecified     Chronic headaches    Personal history of other diseases of the female genital tract     Personal history of amenorrhea    Personal history of other malignant neoplasm of skin     History of basal cell carcinoma (BCC)    Unspecified ovarian cyst, right side     Bilateral ovarian cysts    Urinary tract infection, site not specified     UTI (lower urinary tract infection)    Urinary tract infection, site not specified 08/18/2015    Acute UTI       Provider assessment  Diagnosis  Medication Reviewed - yes  Prior to Admission medications    Medication Sig Start Date End Date Taking? Authorizing Provider   sodium,potassium,mag sulfates (Suprep Bowel Prep Kit) 17.5-3.13-1.6 gram recon soln solution Take 1 bottle by mouth every 12 hours. 3/22/24  Yes Sondra PATEL MD   SUMAtriptan (Imitrex) 100 mg tablet Take 1 tablet (100 mg) by mouth if needed for migraine. take 1 tablet at onset. May repeat every 2 hours if no improvement 2/13/24  Yes Ivet Mendiola, DO       This is my H&P    Physical Exam  Physical Exam  Constitutional:        Comments: Awake   HENT:      Head: Normocephalic.   Cardiovascular:      Rate and Rhythm: Normal rate and regular rhythm.   Pulmonary:      Effort: Pulmonary effort is normal.      Breath sounds: Normal breath sounds.   Abdominal:      General: Bowel sounds are normal.      Palpations: Abdomen is soft.   Neurological:      Mental Status: She is alert.   Psychiatric:         Mood and Affect: Mood normal.           Oropharyngeal Classification II (hard and soft palate, upper portion of tonsils anduvula visible)  ASA PS Classification 2  Sedation Plan Deep  Procedure Plan - pre-procedural (re)assesment completed by physician:  discharge/transfer patient when discharge criteria met    Sondra Rubin MD  4/11/2024 11:26 AM

## 2024-04-11 NOTE — DISCHARGE INSTRUCTIONS
Patient Instructions Post Endoscopy Procedure      The anesthetics, sedatives or narcotics which were given to you today will be acting in your body for the next 24 hours, so you might feel a little sleepy or groggy.  This feeling should slowly wear off. Carefully read and follow the instructions.     You received sedation today:  - Do not drive or operate any machinery or power tools of any kind.   - No alcoholic beverages today, not even beer or wine.  - Do not make any important decisions or sign any legal documents.  - No over the counter medications that contain alcohol or that may cause drowsiness.    While it is common to experience mild to moderate abdominal distention, gas, or belching after your procedure, if any of these symptoms occur following discharge from the GI Lab or within one week of having your procedure, call the [facility] to be advised whether a visit to your nearest Urgent Care or Emergency Department is indicated.  Take this paper with you if you go.   - If you develop an allergic reaction to the medications that were given during your procedure such as difficulty breathing, rash, hives, severe nausea, vomiting or lightheadedness.  - If you experience chest pain, shortness of breath, severe abdominal pain, fevers and chills.  -If you develop signs and symptoms of bleeding such as blood in your spit, if your stools turn black, tarry, or bloody  - If you have not urinated within 8 hours following your procedure.  - If your IV site becomes painful, red, inflamed, or looks infected.    If you received a biopsy/polypectomy the following instructions apply below:  __ Do not use non-steroidal medications or anti-coagulants for one week following your procedure. (Examples of these types of medications are: Advil, Arthrotec, Aleve, Coumadin, Ecotrin, Heparin, Ibuprofen, Indocin, Motrin, Naprosyn, Nuprin, Plavix, Vioxx, and Voltarin, or their generic forms.  This list is not  all-inclusive.  Check with your physician or pharmacist before resuming medications.)   __ Eat a soft diet today.  Avoid foods that are poorly digested for the next 24 hours.  These foods would include: nuts, beans, lettuce, red meats, and fried foods. Start with liquids and advance your diet as tolerated, gradually work up to eating solids.   __ You can restart your ASA tomorrow  __ You can resume your anticoagulation therapy -     Your physician recommends the additional following instructions:    -You have a contact number available for emergencies. The signs and symptoms of potential delayed complications were discussed with you. You may return to normal activities tomorrow.  -Resume your previous diet or other if specified.  -Continue your present medications.   -We are waiting for your pathology results, if applicable. The results will be available in [employer]. I will send you a message with any recommendations.  -The findings and recommendations have been discussed with you and/or family.  -Please see Medication Reconciliation Form for new medication/medications prescribed.     If you experience any problems or have any questions following discharge from the GI Lab, please call: [phone] from 7 am- 4:30 pm.  In the event of an emergency please go to the closest Emergency Department or call Dr. Rubin at [phone]

## 2024-04-11 NOTE — ANESTHESIA PREPROCEDURE EVALUATION
Patient: Sheeba Omalley    Procedure Information       Date/Time: 04/11/24 1120    Scheduled providers: Sondra PATEL MD; FRANNIE Lao-CRNA    Procedure: COLONOSCOPY    Location: [facility]            Relevant Problems   Neuro   (+) Chronic headache disorder      GI   (+) GERD (gastroesophageal reflux disease)   (+) Gastrointestinal hemorrhage       Clinical information reviewed:   Tobacco  Allergies  Meds  Problems  Med Hx  Surg Hx   Fam Hx  Soc   Hx        NPO Detail:  NPO/Void Status  Date of Last Liquid: 04/11/24  Time of Last Liquid: 0645  Date of Last Solid: 04/09/24  Last Intake Type: Clear fluids         Physical Exam    Airway  Mallampati: I  TM distance: >3 FB  Neck ROM: full     Cardiovascular - normal exam     Dental    Pulmonary - normal exam     Abdominal - normal exam         Anesthesia Plan    History of general anesthesia?: yes  History of complications of general anesthesia?: no    ASA 2     MAC   (Preoxygenated 2L prior to procedure.  Patient positioned self to comfort prior to sedation administered; eyes closed; continuous monitoring)  The patient is not a current smoker.    intravenous induction   Anesthetic plan and risks discussed with patient.    Plan discussed with CRNA.

## 2024-04-11 NOTE — ANESTHESIA POSTPROCEDURE EVALUATION
Patient: Sheeba Omalley    Procedure Summary       Date: 04/11/24 Room / Location: [facility]    Anesthesia Start: 1131 Anesthesia Stop:     Procedure: COLONOSCOPY Diagnosis: Screen for colon cancer    Scheduled Providers: Sondra PATEL MD; ELI Lao Responsible Provider: ELI Lao    Anesthesia Type: MAC ASA Status: 2            Anesthesia Type: MAC    Vitals Value Taken Time   BP 95/60 04/11/24 1152   Temp 36.7 04/11/24 1152   Pulse 84 04/11/24 1152   Resp 19 04/11/24 1152   SpO2 99 04/11/24 1152       Anesthesia Post Evaluation    Patient location during evaluation: bedside  Patient participation: complete - patient participated  Level of consciousness: awake  Pain score: 0  Pain management: adequate  Airway patency: patent  Cardiovascular status: acceptable  Respiratory status: acceptable and room air  Hydration status: acceptable  Postoperative Nausea and Vomiting: none      No notable events documented.

## 2024-05-20 ENCOUNTER — APPOINTMENT (OUTPATIENT)
Dept: RADIOLOGY | Facility: HOSPITAL | Age: 44
End: 2024-05-20
Payer: COMMERCIAL

## 2024-07-02 ENCOUNTER — HOSPITAL ENCOUNTER (OUTPATIENT)
Dept: RADIOLOGY | Facility: HOSPITAL | Age: 44
Discharge: HOME | End: 2024-07-02
Payer: COMMERCIAL

## 2024-07-02 VITALS — BODY MASS INDEX: 18.16 KG/M2 | HEIGHT: 66 IN | WEIGHT: 113 LBS

## 2024-07-02 DIAGNOSIS — Z12.31 VISIT FOR SCREENING MAMMOGRAM: ICD-10-CM

## 2024-07-02 PROCEDURE — 77067 SCR MAMMO BI INCL CAD: CPT

## 2024-08-25 DIAGNOSIS — G43.109 OCULAR MIGRAINE: ICD-10-CM

## 2024-08-26 RX ORDER — SUMATRIPTAN SUCCINATE 100 MG/1
TABLET ORAL
Qty: 12 TABLET | Refills: 1 | Status: SHIPPED | OUTPATIENT
Start: 2024-08-26

## 2024-09-12 ENCOUNTER — APPOINTMENT (OUTPATIENT)
Dept: PRIMARY CARE | Facility: CLINIC | Age: 44
End: 2024-09-12
Payer: COMMERCIAL

## 2024-09-12 VITALS
SYSTOLIC BLOOD PRESSURE: 102 MMHG | HEIGHT: 66 IN | TEMPERATURE: 98.3 F | RESPIRATION RATE: 16 BRPM | WEIGHT: 110 LBS | HEART RATE: 72 BPM | BODY MASS INDEX: 17.68 KG/M2 | OXYGEN SATURATION: 100 % | DIASTOLIC BLOOD PRESSURE: 72 MMHG

## 2024-09-12 DIAGNOSIS — Z00.00 HEALTH CARE MAINTENANCE: Primary | ICD-10-CM

## 2024-09-12 DIAGNOSIS — G43.109 OCULAR MIGRAINE: ICD-10-CM

## 2024-09-12 PROBLEM — S03.40XA SPRAIN OF TEMPOROMANDIBULAR JOINT: Status: RESOLVED | Noted: 2024-03-21 | Resolved: 2024-09-12

## 2024-09-12 PROBLEM — H65.00 ACUTE SEROUS OTITIS MEDIA: Status: RESOLVED | Noted: 2024-03-21 | Resolved: 2024-09-12

## 2024-09-12 PROBLEM — R10.84 GENERALIZED ABDOMINAL PAIN: Status: RESOLVED | Noted: 2024-02-13 | Resolved: 2024-09-12

## 2024-09-12 PROBLEM — H92.03 OTALGIA OF BOTH EARS: Status: RESOLVED | Noted: 2024-03-21 | Resolved: 2024-09-12

## 2024-09-12 PROBLEM — K92.2 GASTROINTESTINAL HEMORRHAGE: Status: RESOLVED | Noted: 2024-03-21 | Resolved: 2024-09-12

## 2024-09-12 PROBLEM — K60.2 ANAL FISSURE: Status: RESOLVED | Noted: 2024-03-21 | Resolved: 2024-09-12

## 2024-09-12 PROCEDURE — 1036F TOBACCO NON-USER: CPT | Performed by: INTERNAL MEDICINE

## 2024-09-12 PROCEDURE — 99396 PREV VISIT EST AGE 40-64: CPT | Performed by: INTERNAL MEDICINE

## 2024-09-12 PROCEDURE — 3008F BODY MASS INDEX DOCD: CPT | Performed by: INTERNAL MEDICINE

## 2024-09-12 RX ORDER — SUMATRIPTAN SUCCINATE 100 MG/1
TABLET ORAL
Qty: 12 TABLET | Refills: 1 | Status: SHIPPED | OUTPATIENT
Start: 2024-09-12

## 2024-09-12 ASSESSMENT — ENCOUNTER SYMPTOMS
SHORTNESS OF BREATH: 0
EYE PAIN: 0
ARTHRALGIAS: 0
WEAKNESS: 0
FATIGUE: 0
DIARRHEA: 0
CONSTIPATION: 0
PALPITATIONS: 0
NAUSEA: 0
BACK PAIN: 0
COUGH: 0
ABDOMINAL PAIN: 0
PSYCHIATRIC NEGATIVE: 1
EYE REDNESS: 0
DIFFICULTY URINATING: 0
UNEXPECTED WEIGHT CHANGE: 0
RHINORRHEA: 0
EYE ITCHING: 0
FREQUENCY: 0
HEADACHES: 0
DYSURIA: 0
WHEEZING: 0
FEVER: 0
SORE THROAT: 0

## 2024-09-12 NOTE — PROGRESS NOTES
"Subjective   Sheeba E Shahram \"Nithya\" is a 43 y.o. female who presents for Annual Exam.    HPI   Influenza 2023   Covid 2021  Tdap 2017  Mammogram 2024  Pap 2020 GYN  Colonoscopy 2024  Bmi 17  Eye exam 24  Depression screen 24    Review of Systems   Constitutional:  Negative for fatigue, fever and unexpected weight change.   HENT:  Negative for congestion, ear pain, rhinorrhea and sore throat.    Eyes:  Negative for pain, redness and itching.   Respiratory:  Negative for cough, shortness of breath and wheezing.    Cardiovascular:  Negative for chest pain, palpitations and leg swelling.   Gastrointestinal:  Negative for abdominal pain, constipation, diarrhea and nausea.   Genitourinary:  Negative for difficulty urinating, dysuria and frequency.   Musculoskeletal:  Negative for arthralgias and back pain.   Allergic/Immunologic: Negative for environmental allergies, food allergies and immunocompromised state.   Neurological:  Negative for weakness and headaches.   Psychiatric/Behavioral: Negative.     All other systems reviewed and are negative.      Health Maintenance Due   Topic Date Due    HIV Screening  Never done    MMR Vaccines (1 of 1 - Standard series) Never done    Hepatitis C Screening  Never done    Hepatitis B Vaccines (1 of 3 - 19+ 3-dose series) Never done    Hepatitis A Vaccines (1 of 2 - Risk 2-dose series) Never done    Cervical Cancer Screening  07/30/2023    Influenza Vaccine (1) 09/01/2024    COVID-19 Vaccine (3 - 2023-24 season) 09/01/2024       Objective   /72   Pulse 72   Temp 36.8 °C (98.3 °F)   Resp 16   Ht 1.676 m (5' 6\")   Wt 49.9 kg (110 lb)   SpO2 100%   BMI 17.75 kg/m²     Physical Exam  Vitals and nursing note reviewed.   Constitutional:       Appearance: Normal appearance.   HENT:      Head: Normocephalic.   Eyes:      Conjunctiva/sclera: Conjunctivae normal.      Pupils: Pupils are equal, round, and reactive to light.   Cardiovascular:      Rate and Rhythm: Normal rate and " regular rhythm.      Pulses: Normal pulses.      Heart sounds: Normal heart sounds.   Pulmonary:      Effort: Pulmonary effort is normal.      Breath sounds: Normal breath sounds.   Musculoskeletal:         General: No swelling.      Cervical back: Neck supple.   Skin:     General: Skin is warm and dry.   Neurological:      General: No focal deficit present.      Mental Status: She is oriented to person, place, and time.         Assessment/Plan   Problem List Items Addressed This Visit       Ocular migraine    Relevant Medications    SUMAtriptan (Imitrex) 100 mg tablet     Other Visit Diagnoses       Health care maintenance    -  Primary    Relevant Orders    CBC    Comprehensive Metabolic Panel    Lipid Panel    Thyroid Stimulating Hormone    Vitamin B12    Vitamin D 25-Hydroxy,Total (for eval of Vitamin D levels)

## 2025-01-23 ENCOUNTER — APPOINTMENT (OUTPATIENT)
Dept: OPHTHALMOLOGY | Facility: CLINIC | Age: 45
End: 2025-01-23
Payer: COMMERCIAL

## 2025-05-08 ENCOUNTER — OFFICE VISIT (OUTPATIENT)
Dept: OBSTETRICS AND GYNECOLOGY | Facility: CLINIC | Age: 45
End: 2025-05-08
Payer: COMMERCIAL

## 2025-05-08 VITALS
SYSTOLIC BLOOD PRESSURE: 92 MMHG | DIASTOLIC BLOOD PRESSURE: 54 MMHG | HEIGHT: 66 IN | BODY MASS INDEX: 17.94 KG/M2 | WEIGHT: 111.6 LBS

## 2025-05-08 DIAGNOSIS — Z01.419 WELL WOMAN EXAM WITH ROUTINE GYNECOLOGICAL EXAM: ICD-10-CM

## 2025-05-08 DIAGNOSIS — Z12.31 ENCOUNTER FOR SCREENING MAMMOGRAM FOR MALIGNANT NEOPLASM OF BREAST: ICD-10-CM

## 2025-05-08 DIAGNOSIS — Z80.3 FAMILY HISTORY OF BREAST CANCER: Primary | ICD-10-CM

## 2025-05-08 PROCEDURE — 3008F BODY MASS INDEX DOCD: CPT | Performed by: OBSTETRICS & GYNECOLOGY

## 2025-05-08 PROCEDURE — 87626 HPV SEP HI-RSK TYP&POOL RSLT: CPT

## 2025-05-08 PROCEDURE — 99396 PREV VISIT EST AGE 40-64: CPT | Performed by: OBSTETRICS & GYNECOLOGY

## 2025-05-08 NOTE — PROGRESS NOTES
"Subjective   Patient ID: Sheeba Omalley \"Shiela" is a 44 y.o. female who presents for Annual Exam.  HPI  Pt presents for annual exam.  Pt states that periods are monthly, last 4-5 days, not heavy or crampy.  Pt uses nothing for birth control.  No other problems.     Review of Systems  all other symptoms were found to be neg except for HPI/CC.      Objective   Physical Exam  Chest:   Breasts:     Right: Normal.      Left: Normal.       Constitutional:       Appearance: Normal appearance.   HENT:      Head: Normocephalic and atraumatic.      Nose: Nose normal.   Cardiovascular:      Rate and Rhythm: Normal rate and regular rhythm.   Pulmonary:      Effort: Pulmonary effort is normal.   Abdominal:      Palpations: Abdomen is soft.   Genitourinary:     General: Normal vulva.      Exam position: Lithotomy position.      Brayden stage (genital): 5.      Labia:         Right: No rash, tenderness, lesion or injury.         Left: No rash, tenderness, lesion or injury.       Urethra: No prolapse, urethral pain, urethral swelling or urethral lesion.      Vagina: Normal.      Cervix: Normal.      Uterus: Normal.       Adnexa: Right adnexa normal and left adnexa normal.        Right: No mass, tenderness or fullness.          Left: No mass, tenderness or fullness.     Musculoskeletal:         General: Normal range of motion.      Cervical back: Normal range of motion.   Skin:     General: Skin is warm and dry.   Neurological:      General: No focal deficit present.      Mental Status: She is alert.   Psychiatric:         Mood and Affect: Mood normal.         Behavior: Behavior normal.      Assessment/Plan   Diagnoses and all orders for this visit:  Encounter for screening mammogram for malignant neoplasm of breast  -     BI mammo bilateral screening tomosynthesis; Future  Well woman exam with routine gynecological exam  -     THINPREP PAP TEST (>30)     Pap done  Mamm ordered  Referral genetics for  breast cancer  Talked about " Dr. Henriquez:  Please see E-Advice from Pt. Regarding his prednisone.  Please advise.          Kishore Hernández to Sonido Henriquez MD        3/9/21 9:29 AM  This message is being sent by Denisha Hernández on behalf of Kishore Campig needed to cancel his appointments on March 19th for a PFT and visit with Dr. Henriquez.  We are not able to get new appointments until April 14th.  Our question is should Kishore stay on the 40mg of prednisone until April 14th?  Please let us know.  Thanks,        Denisha Hernández     menopause/perimenopause  Pt is to F/U in 1 yr for annual exam or PRN.  Pt is to call with any questions of concerns.     Erendira Weaver DO 05/08/25 9:57 AM

## 2025-07-16 ENCOUNTER — HOSPITAL ENCOUNTER (OUTPATIENT)
Dept: RADIOLOGY | Facility: HOSPITAL | Age: 45
Discharge: HOME | End: 2025-07-16
Payer: COMMERCIAL

## 2025-07-16 DIAGNOSIS — Z12.31 ENCOUNTER FOR SCREENING MAMMOGRAM FOR MALIGNANT NEOPLASM OF BREAST: ICD-10-CM

## 2025-07-16 PROCEDURE — 77063 BREAST TOMOSYNTHESIS BI: CPT | Performed by: RADIOLOGY

## 2025-07-16 PROCEDURE — 77067 SCR MAMMO BI INCL CAD: CPT | Performed by: RADIOLOGY

## 2025-07-16 PROCEDURE — 77063 BREAST TOMOSYNTHESIS BI: CPT

## 2025-07-28 DIAGNOSIS — G43.109 OCULAR MIGRAINE: ICD-10-CM

## 2025-07-28 RX ORDER — SUMATRIPTAN SUCCINATE 100 MG/1
TABLET ORAL
Qty: 12 TABLET | Refills: 1 | Status: SHIPPED | OUTPATIENT
Start: 2025-07-28

## 2025-07-31 ENCOUNTER — HOSPITAL ENCOUNTER (OUTPATIENT)
Dept: CARDIOLOGY | Facility: HOSPITAL | Age: 45
Discharge: HOME | End: 2025-07-31
Payer: COMMERCIAL

## 2025-07-31 DIAGNOSIS — I83.813 VARICOSE VEINS OF BOTH LOWER EXTREMITIES WITH PAIN: ICD-10-CM

## 2025-07-31 PROCEDURE — 93970 EXTREMITY STUDY: CPT

## 2025-09-15 ENCOUNTER — APPOINTMENT (OUTPATIENT)
Dept: PRIMARY CARE | Facility: CLINIC | Age: 45
End: 2025-09-15
Payer: COMMERCIAL